# Patient Record
Sex: MALE | Race: BLACK OR AFRICAN AMERICAN | NOT HISPANIC OR LATINO | Employment: UNEMPLOYED | URBAN - METROPOLITAN AREA
[De-identification: names, ages, dates, MRNs, and addresses within clinical notes are randomized per-mention and may not be internally consistent; named-entity substitution may affect disease eponyms.]

---

## 2023-01-21 ENCOUNTER — LAB REQUISITION (OUTPATIENT)
Dept: LAB | Facility: CLINIC | Age: 68
End: 2023-01-21
Payer: COMMERCIAL

## 2023-01-21 DIAGNOSIS — I10 ESSENTIAL (PRIMARY) HYPERTENSION: ICD-10-CM

## 2023-01-23 LAB
ALBUMIN SERPL BCG-MCNC: 3.8 G/DL (ref 3.5–5.2)
ALP SERPL-CCNC: 83 U/L (ref 40–129)
ALT SERPL W P-5'-P-CCNC: 47 U/L (ref 10–50)
ANION GAP SERPL CALCULATED.3IONS-SCNC: 15 MMOL/L (ref 7–15)
AST SERPL W P-5'-P-CCNC: 23 U/L (ref 10–50)
BASOPHILS # BLD AUTO: 0 10E3/UL (ref 0–0.2)
BASOPHILS NFR BLD AUTO: 0 %
BILIRUB DIRECT SERPL-MCNC: <0.2 MG/DL (ref 0–0.3)
BILIRUB SERPL-MCNC: 0.5 MG/DL
BUN SERPL-MCNC: 11.6 MG/DL (ref 8–23)
CALCIUM SERPL-MCNC: 9 MG/DL (ref 8.8–10.2)
CHLORIDE SERPL-SCNC: 99 MMOL/L (ref 98–107)
CREAT SERPL-MCNC: 0.86 MG/DL (ref 0.67–1.17)
DEPRECATED CALCIDIOL+CALCIFEROL SERPL-MC: 27 UG/L (ref 20–75)
DEPRECATED HCO3 PLAS-SCNC: 22 MMOL/L (ref 22–29)
EOSINOPHIL # BLD AUTO: 0 10E3/UL (ref 0–0.7)
EOSINOPHIL NFR BLD AUTO: 1 %
ERYTHROCYTE [DISTWIDTH] IN BLOOD BY AUTOMATED COUNT: 13.9 % (ref 10–15)
GFR SERPL CREATININE-BSD FRML MDRD: >90 ML/MIN/1.73M2
GLUCOSE SERPL-MCNC: 182 MG/DL (ref 70–99)
HCT VFR BLD AUTO: 33.6 % (ref 40–53)
HGB BLD-MCNC: 11 G/DL (ref 13.3–17.7)
IMM GRANULOCYTES # BLD: 0 10E3/UL
IMM GRANULOCYTES NFR BLD: 1 %
LYMPHOCYTES # BLD AUTO: 1.4 10E3/UL (ref 0.8–5.3)
LYMPHOCYTES NFR BLD AUTO: 20 %
MAGNESIUM SERPL-MCNC: 1.8 MG/DL (ref 1.7–2.3)
MCH RBC QN AUTO: 34.2 PG (ref 26.5–33)
MCHC RBC AUTO-ENTMCNC: 32.7 G/DL (ref 31.5–36.5)
MCV RBC AUTO: 104 FL (ref 78–100)
MONOCYTES # BLD AUTO: 0.6 10E3/UL (ref 0–1.3)
MONOCYTES NFR BLD AUTO: 8 %
NEUTROPHILS # BLD AUTO: 4.8 10E3/UL (ref 1.6–8.3)
NEUTROPHILS NFR BLD AUTO: 70 %
NRBC # BLD AUTO: 0 10E3/UL
NRBC BLD AUTO-RTO: 0 /100
PHOSPHATE SERPL-MCNC: 3.6 MG/DL (ref 2.5–4.5)
PLATELET # BLD AUTO: 151 10E3/UL (ref 150–450)
POTASSIUM SERPL-SCNC: 3.9 MMOL/L (ref 3.4–5.3)
PROT SERPL-MCNC: 5.7 G/DL (ref 6.4–8.3)
RBC # BLD AUTO: 3.22 10E6/UL (ref 4.4–5.9)
SODIUM SERPL-SCNC: 136 MMOL/L (ref 136–145)
WBC # BLD AUTO: 6.8 10E3/UL (ref 4–11)

## 2023-01-23 PROCEDURE — 36415 COLL VENOUS BLD VENIPUNCTURE: CPT | Mod: ORL | Performed by: NURSE PRACTITIONER

## 2023-01-23 PROCEDURE — 83735 ASSAY OF MAGNESIUM: CPT | Mod: ORL | Performed by: NURSE PRACTITIONER

## 2023-01-23 PROCEDURE — 84100 ASSAY OF PHOSPHORUS: CPT | Mod: ORL | Performed by: NURSE PRACTITIONER

## 2023-01-23 PROCEDURE — P9604 ONE-WAY ALLOW PRORATED TRIP: HCPCS | Mod: ORL | Performed by: NURSE PRACTITIONER

## 2023-01-23 PROCEDURE — 82248 BILIRUBIN DIRECT: CPT | Mod: ORL | Performed by: NURSE PRACTITIONER

## 2023-01-23 PROCEDURE — 85025 COMPLETE CBC W/AUTO DIFF WBC: CPT | Mod: ORL | Performed by: NURSE PRACTITIONER

## 2023-01-23 PROCEDURE — 82306 VITAMIN D 25 HYDROXY: CPT | Mod: ORL | Performed by: NURSE PRACTITIONER

## 2023-02-14 ENCOUNTER — TELEPHONE (OUTPATIENT)
Dept: TRANSPLANT | Facility: CLINIC | Age: 68
End: 2023-02-14
Payer: COMMERCIAL

## 2023-02-14 DIAGNOSIS — C90.00 MULTIPLE MYELOMA, REMISSION STATUS UNSPECIFIED (H): Primary | ICD-10-CM

## 2023-04-12 ENCOUNTER — CARE COORDINATION (OUTPATIENT)
Dept: TRANSPLANT | Facility: CLINIC | Age: 68
End: 2023-04-12
Payer: COMMERCIAL

## 2023-04-12 NOTE — PROGRESS NOTES
[facility] BMT and Cell Therapy Program  RN Coordinator Pre-Visit Documentation      Fito Deal is a 67 year old male who has been referred to the [facility] BMT and Cell Therapy Program for hematopoietic cell transplant or immune effector cell therapy.      Referring MD Name: Dr. Toi Colón, telephone [phone]    Referring RN Coordinator: Mariel    Reason for referral: multiple myeloma    Link to BMT & CT Program Algorithms      All relevant clinical notes, labs, imaging, and pathology may be reviewed in Epic Bookmarks under name: Rocio Leon      Patient Care Team       Relationship Specialty Notifications Start End    Valerie Kay PCP - General Geriatric Medicine  2/15/23     Merged    Phone: [phone] Fax: [phone]         [address] [place] [address]    Toi Colón MD Referring Physician Hematology & Oncology  2/15/23     Phone: [phone] Fax: [phone]         [facility] [address] [place] [address]    Elenita Harrell MSW    4/10/23     Phone: [phone] Pager: [phone]                Rocio Leon RN

## 2023-04-14 ENCOUNTER — OFFICE VISIT (OUTPATIENT)
Dept: TRANSPLANT | Facility: CLINIC | Age: 68
End: 2023-04-14
Attending: STUDENT IN AN ORGANIZED HEALTH CARE EDUCATION/TRAINING PROGRAM
Payer: COMMERCIAL

## 2023-04-14 VITALS
SYSTOLIC BLOOD PRESSURE: 144 MMHG | TEMPERATURE: 98.6 F | RESPIRATION RATE: 16 BRPM | HEART RATE: 96 BPM | DIASTOLIC BLOOD PRESSURE: 92 MMHG | OXYGEN SATURATION: 98 %

## 2023-04-14 DIAGNOSIS — C90.01 MULTIPLE MYELOMA IN REMISSION (H): Primary | ICD-10-CM

## 2023-04-14 PROCEDURE — G0463 HOSPITAL OUTPT CLINIC VISIT: HCPCS | Performed by: STUDENT IN AN ORGANIZED HEALTH CARE EDUCATION/TRAINING PROGRAM

## 2023-04-14 PROCEDURE — 99205 OFFICE O/P NEW HI 60 MIN: CPT | Performed by: STUDENT IN AN ORGANIZED HEALTH CARE EDUCATION/TRAINING PROGRAM

## 2023-04-14 RX ORDER — POLYETHYLENE GLYCOL 3350 17 G/17G
17 POWDER, FOR SOLUTION ORAL DAILY
COMMUNITY
Start: 2023-03-21

## 2023-04-14 RX ORDER — ATORVASTATIN CALCIUM 40 MG/1
1 TABLET, FILM COATED ORAL DAILY
COMMUNITY
Start: 2021-07-02

## 2023-04-14 RX ORDER — LIDOCAINE 4 G/G
1 PATCH TOPICAL
COMMUNITY
Start: 2022-07-23

## 2023-04-14 RX ORDER — GABAPENTIN 300 MG/1
300 CAPSULE ORAL 3 TIMES DAILY
COMMUNITY

## 2023-04-14 RX ORDER — CYCLOBENZAPRINE HCL 5 MG
10 TABLET ORAL
COMMUNITY
Start: 2023-02-28

## 2023-04-14 RX ORDER — ASPIRIN 325 MG
325 TABLET, DELAYED RELEASE (ENTERIC COATED) ORAL
COMMUNITY

## 2023-04-14 RX ORDER — FUROSEMIDE 20 MG
20 TABLET ORAL DAILY
COMMUNITY

## 2023-04-14 RX ORDER — ALFUZOSIN HYDROCHLORIDE 10 MG/1
1 TABLET, EXTENDED RELEASE ORAL DAILY
COMMUNITY
Start: 2021-10-27

## 2023-04-14 RX ORDER — MORPHINE SULFATE 15 MG/1
TABLET, FILM COATED, EXTENDED RELEASE ORAL
COMMUNITY
Start: 2023-04-06 | End: 2023-05-04

## 2023-04-14 RX ORDER — DULOXETIN HYDROCHLORIDE 60 MG/1
60 CAPSULE, DELAYED RELEASE ORAL DAILY
COMMUNITY
Start: 2023-01-11

## 2023-04-14 RX ORDER — OXYCODONE HYDROCHLORIDE 5 MG/1
15 TABLET ORAL
COMMUNITY
Start: 2023-02-28

## 2023-04-14 RX ORDER — CARVEDILOL 25 MG/1
25 TABLET ORAL
COMMUNITY
Start: 2021-05-16

## 2023-04-14 RX ORDER — VALACYCLOVIR HYDROCHLORIDE 500 MG/1
500 TABLET, FILM COATED ORAL
COMMUNITY
Start: 2022-11-14

## 2023-04-14 RX ORDER — SENNOSIDES A AND B 8.6 MG/1
8.6 TABLET, FILM COATED ORAL
COMMUNITY
Start: 2023-03-21

## 2023-04-14 NOTE — LETTER
4/14/2023         RE: Fito Deal  [address]  [address]  [facility] [address]        Dear Colleague,    Thank you for referring your patient, Fito Deal, to the [facility] BLOOD AND MARROW TRANSPLANT PROGRAM [place]. Please see a copy of my visit note below.         BMT / Cell Therapy Consultation      Fito Deal is a 67 year old male referred by Dr. Toi Colón for multiple myeloma.      Disease presentation and baseline characteristics:   8/4/22: IgG multiple myeloma, standard risk, R-ISS II  M-spike 4.3 at diagnosis, FLC normal    Date Treatment Name Response Side Effects / Toxicities   10/22 DVd x 8 cycles Likely CR (pending full restaging)           HPI:  Please see my entry above for hematologic history.  Mr. Deal is seen today to accompanied by his daughter Sadaf (by phone) to discuss possible autologous stem cell transplant for myeloma.  Mr. Deal is currently living at a nursing home due to his significant caregiver needs.  He is in an electric wheelchair and notes he requires this to move around at all times.  He notes that the majority of his physical limitations come from his back pain which developed in conjunction with his myeloma diagnosis.  He has not been able to walk independently in many months and requires significant assistance at home.  Sadaf notes that a year ago Mr. Deal was much more fit/independent, but she is concerned about his generalized weakness and physical status as it now stands.  Mr. Deal has been recommended for Zometa but has not yet received it due to missing multiple dental appointments.      ASSESSMENT AND PLAN:  We reviewed the risks and benefits of autologous stem cell transplant in detail today.  Based on his performance status Mr. Deal is not a candidate for transplant as the risks of the procedure outweigh the potential benefits.  In terms of performance status at threshold for safely moving ahead with transplant is  approximately a KPS of 70 and Mr. Deal has a KPS of 40.  Both Mr. Deal and Sadaf were in agreement with foregoing transplant after our discussion.    Although he is not a transplant candidate, I do anticipate that Mr. Deal will both need and be able to tolerate a number of standard myeloma therapies and we therefore spent significant time discussing the management of myeloma today.  Mr. Deal defers a number of his medical decisions to Sadaf and she was worried about the risk of secondary malignancies associated with lenalidomide.  I discussed with her that although this risk is real it is relatively low and/malignancies typically occur after years of therapy.  I therefore recommended that at the time of next relapse lenalidomide be incorporated as it is an extremely effective antimyeloma therapy and I worried that omission of IMIDs in general will shorten Mr. Deal's life considerably.  Sadaf felt more comfortable with this and will discuss with Dr Colón at time of next relapse.    I emphasized the importance of Zometa and encouraged Mr. Deal to see his dentist for clearance.    Sadaf notes that Mr. Deal has not been getting adequate rehab services at his nursing home and wonders if he can get a physical therapy referral.  I noted that this would come from Dr. Colón's office but certainly seems appropriate and I will request that she send a referral.    Inability to proceed with autotransplant does not necessarily preclude the use of cellular therapy.  Both bispecific and CAR-T therapy should be considered in later lines of treatment when indicated.    For now I recommend completing a total of 8-12 cycles of DVd followed by single agent maintenance therapy (either daratumumab or velcade).    I called Dr. Colón to discuss these recommendations.    Summary: Recommend completing 8-12 cycles of D-Vd followed by maintenance therapy, transplant not indicated due to performance status.    I spent 60  minutes in the care of this patient today, which included time necessary for preparation for the visit, obtaining history, ordering medications/tests/procedures as medically indicated, review of pertinent medical literature, counseling of the patient, communication of recommendations to the care team, and documentation time.    Jaime Judge MD    ROS:    10 point ROS neg other than the symptoms noted above in the HPI.      Past Medical History:   Diagnosis Date    Glaucoma     OAG RE, traumatic glauc LE    Hypertension     Nonsenile cataract     Proptosis        Past Surgical History:   Procedure Laterality Date    LEG SURGERY      SHOULDER SURGERY         No family history on file.    Social History     Tobacco Use    Smoking status: Former     Types: Cigarettes     Quit date: 2013     Years since quittin.4     Passive exposure: Past   Substance Use Topics    Alcohol use: No         Allergies   Allergen Reactions    Ledipasvir-Sofosbuvir Rash     He developed a rash that required prednisone and Benadryl.  He developed a rash that required prednisone and Benadryl.  He developed a rash that required prednisone and Benadryl.          Current Outpatient Medications   Medication Sig Dispense Refill    alfuzosin ER (UROXATRAL) 10 MG 24 hr tablet Take 1 tablet by mouth daily      alum & mag hydroxide-simethicone 80 mL, diphenhydrAMINE 80 mL, lidocaine (viscous) 80 mL magic mouthwash Take 10 mLs by mouth every 6 hours as needed for mouth sores      aspirin (ASA) 325 MG EC tablet Take 325 mg by mouth      atorvastatin (LIPITOR) 40 MG tablet Take 1 tablet by mouth daily      carvedilol (COREG) 25 MG tablet Take 25 mg by mouth      cyclobenzaprine (FLEXERIL) 5 MG tablet Take 10 mg by mouth      DIAZEPAM PO Take 5 mg by mouth daily      DULoxetine (CYMBALTA) 60 MG capsule Take 60 mg by mouth daily      furosemide (LASIX) 20 MG tablet Take 20 mg by mouth daily      gabapentin (NEURONTIN) 300 MG capsule Take 300 mg  by mouth 3 times daily      IBUPROFEN PO       insulin  UNIT/ML injection Inject 15 units once weekly on days that patient receives dexamethasone.      Lidocaine (LIDOCARE) 4 % Patch Place 1 patch onto the skin      LISINOPRIL PO       magic mouthwash (ENTER INGREDIENTS IN COMMENTS) suspension 5 mLs      morphine (MS CONTIN) 15 MG CR tablet Indications: chronic painTake 1 tablet (15mg) with a 30mg tablet = 45mg TWICE A DAY      multivitamin, therapeutic with minerals (MULTI-VITAMIN) TABS Take 1 tablet by mouth daily      naloxone (NARCAN) 4 MG/0.1ML nasal spray Spray 4 mg in nostril      Omega-3 Fatty Acids (OMEGA-3 FISH OIL PO) Take 1 capsule by mouth daily      oxyCODONE (ROXICODONE) 5 MG tablet Take 15 mg by mouth      polyethylene glycol (MIRALAX) 17 GM/Dose powder Take 17 g by mouth daily      senna (SENOKOT) 8.6 MG tablet Take 8.6 mg by mouth      TRAVATAN Z 0.004 % ophthalmic solution Apply 1 drop to eye At Bedtime 1 Bottle 11    travoprost Z, benzalkonium, (TRAVATAN Z) 0.004 % ophthalmic solution 1 drop At Bedtime BE      UNKNOWN TO PATIENT STATES HE IS NOT SURE WHAT MEDS HE TAKES      valACYclovir (VALTREX) 500 MG tablet Take 500 mg by mouth           Physical Exam:     Vital Signs: BP (!) 144/92 (BP Location: Left arm, Patient Position: Sitting, Cuff Size: Adult Regular)   Pulse 96   Temp 98.6  F (37  C) (Oral)   Resp 16   SpO2 98%     KPS: 40  General Appearance: alert, sitting up in wheelchair, appears frail  Eyes: PERRL, conjunctiva and lids normal, sclera nonicteric  Ears/Nose/M/Throat: Oral mucosa and posterior oropharynx normal, moist mucous membranes  Neck supple, non-tender, free range of motion, no adenopathy  Cardio/Vascular:regular rate and rhythm, normal S1 and S2, no murmur  Resp Effort And Auscultation: Normal - Clear to auscultation without rales, rhonchi, or wheezing.  GI: soft, nontender, bowel sounds present in all four quadrants, no hepatosplenomegaly  Lymphatics:no  significant enlargement of lymph nodes globally   Edema: trace LE edema  Skin: Skin color, texture, turgor normal. No rashes or lesions.  Psych/Affect: Mood and affect are appropriate.    Blood Counts     Recent Labs   Lab Test 01/23/23  1110   HGB 11.0*   HCT 33.6*   WBC 6.8   ANEUTAUTO 4.8   ALYMPAUTO 1.4   AMONOAUTO 0.6   AEOSAUTO 0.0   ABSBASO 0.0   NRBCMAN 0.0          Chemistries     Basic Panel  Recent Labs   Lab Test 01/23/23  1110      POTASSIUM 3.9   CHLORIDE 99   CO2 22   BUN 11.6   CR 0.86   *        Calcium, Magnesium, Phosphorus  Recent Labs   Lab Test 01/23/23  1110   LUCIA 9.0   MAG 1.8   PHOS 3.6        LFTs  Recent Labs   Lab Test 01/23/23  1110   BILITOTAL 0.5   ALKPHOS 83   AST 23   ALT 47   ALBUMIN 3.8     Fito understood the above assessment and recommendations.  Multiple questions answered.  No barriers to learning identified.    ------------------------------------------------------------------------------------------------------------------------------------------------    Patient Care Team         Relationship Specialty Notifications Start End    Valerie Kay PCP - General Geriatric Medicine  2/15/23     Merged    Phone: [phone] Fax: [phone]         [address] [address]    Toi Colón MD Referring Physician Hematology & Oncology  2/15/23     Phone: [phone] Fax: [phone]         [facility] [address] [facility] [address]    Elenita Harrell MSW    4/10/23     Phone: [phone] Pager: [phone]                    Jaime Judge MD

## 2023-04-14 NOTE — NURSING NOTE
Oncology Rooming Note    April 14, 2023 1:08 PM   Fito Deal is a 67 year old male who presents for:    Chief Complaint   Patient presents with     Oncology Clinic Visit     Multiple myeloma      Initial Vitals: BP (!) 144/92 (BP Location: Left arm, Patient Position: Sitting, Cuff Size: Adult Regular)   Pulse 96   Temp 98.6  F (37  C) (Oral)   Resp 16   SpO2 98%  There is no height or weight on file to calculate BMI. There is no height or weight on file to calculate BSA.  Data Unavailable Comment: 6 & 7 for both   No LMP for male patient.  Allergies reviewed: Yes  Medications reviewed: No    Medications: Medication refills not needed today.  Pharmacy name entered into [facility]: [facility]    Clinical concerns: Patient is not aware of the medications they're taking, couldn't review home medications.  Dr. Judge  was notified.      Fransisco Bates

## 2023-04-18 NOTE — PROGRESS NOTES
BMT / Cell Therapy Consultation      Fito Deal is a 67 year old male referred by Dr. Toi Colón for multiple myeloma.      Disease presentation and baseline characteristics:   8/4/22: IgG multiple myeloma, standard risk, R-ISS II  M-spike 4.3 at diagnosis, FLC normal    Date Treatment Name Response Side Effects / Toxicities   10/22 DVd x 8 cycles Likely CR (pending full restaging)           HPI:  Please see my entry above for hematologic history.  Mr. Deal is seen today to accompanied by his daughter Sadaf (by phone) to discuss possible autologous stem cell transplant for myeloma.  Mr. Deal is currently living at a nursing home due to his significant caregiver needs.  He is in an electric wheelchair and notes he requires this to move around at all times.  He notes that the majority of his physical limitations come from his back pain which developed in conjunction with his myeloma diagnosis.  He has not been able to walk independently in many months and requires significant assistance at home.  Sadaf notes that a year ago Mr. Deal was much more fit/independent, but she is concerned about his generalized weakness and physical status as it now stands.  Mr. Deal has been recommended for Zometa but has not yet received it due to missing multiple dental appointments.      ASSESSMENT AND PLAN:  We reviewed the risks and benefits of autologous stem cell transplant in detail today.  Based on his performance status Mr. Deal is not a candidate for transplant as the risks of the procedure outweigh the potential benefits.  In terms of performance status at threshold for safely moving ahead with transplant is approximately a KPS of 70 and Mr. Deal has a KPS of 40.  Both Mr. Deal and Sadaf were in agreement with foregoing transplant after our discussion.    Although he is not a transplant candidate, I do anticipate that Mr. Deal will both need and be able to tolerate a number of standard myeloma  therapies and we therefore spent significant time discussing the management of myeloma today.  Mr. Deal defers a number of his medical decisions to Sadaf and she was worried about the risk of secondary malignancies associated with lenalidomide.  I discussed with her that although this risk is real it is relatively low and/malignancies typically occur after years of therapy.  I therefore recommended that at the time of next relapse lenalidomide be incorporated as it is an extremely effective antimyeloma therapy and I worried that omission of IMIDs in general will shorten Mr. Deal's life considerably.  Sadaf felt more comfortable with this and will discuss with Dr Colón at time of next relapse.    I emphasized the importance of Zometa and encouraged Mr. Deal to see his dentist for clearance.    Sadaf notes that Mr. Deal has not been getting adequate rehab services at his nursing home and wonders if he can get a physical therapy referral.  I noted that this would come from Dr. Colón's office but certainly seems appropriate and I will request that she send a referral.    Inability to proceed with autotransplant does not necessarily preclude the use of cellular therapy.  Both bispecific and CAR-T therapy should be considered in later lines of treatment when indicated.    For now I recommend completing a total of 8-12 cycles of DVd followed by single agent maintenance therapy (either daratumumab or velcade).    I called Dr. Colón to discuss these recommendations.    Summary: Recommend completing 8-12 cycles of D-Vd followed by maintenance therapy, transplant not indicated due to performance status.    I spent 60 minutes in the care of this patient today, which included time necessary for preparation for the visit, obtaining history, ordering medications/tests/procedures as medically indicated, review of pertinent medical literature, counseling of the patient, communication of recommendations to the  care team, and documentation time.    Jaime Judge MD    ROS:    10 point ROS neg other than the symptoms noted above in the HPI.      Past Medical History:   Diagnosis Date     Glaucoma     OAG RE, traumatic glauc LE     Hypertension      Nonsenile cataract      Proptosis        Past Surgical History:   Procedure Laterality Date     LEG SURGERY       SHOULDER SURGERY         No family history on file.    Social History     Tobacco Use     Smoking status: Former     Types: Cigarettes     Quit date: 2013     Years since quittin.4     Passive exposure: Past   Substance Use Topics     Alcohol use: No         Allergies   Allergen Reactions     Ledipasvir-Sofosbuvir Rash     He developed a rash that required prednisone and Benadryl.  He developed a rash that required prednisone and Benadryl.  He developed a rash that required prednisone and Benadryl.          Current Outpatient Medications   Medication Sig Dispense Refill     alfuzosin ER (UROXATRAL) 10 MG 24 hr tablet Take 1 tablet by mouth daily       alum & mag hydroxide-simethicone 80 mL, diphenhydrAMINE 80 mL, lidocaine (viscous) 80 mL magic mouthwash Take 10 mLs by mouth every 6 hours as needed for mouth sores       aspirin (ASA) 325 MG EC tablet Take 325 mg by mouth       atorvastatin (LIPITOR) 40 MG tablet Take 1 tablet by mouth daily       carvedilol (COREG) 25 MG tablet Take 25 mg by mouth       cyclobenzaprine (FLEXERIL) 5 MG tablet Take 10 mg by mouth       DIAZEPAM PO Take 5 mg by mouth daily       DULoxetine (CYMBALTA) 60 MG capsule Take 60 mg by mouth daily       furosemide (LASIX) 20 MG tablet Take 20 mg by mouth daily       gabapentin (NEURONTIN) 300 MG capsule Take 300 mg by mouth 3 times daily       IBUPROFEN PO        insulin  UNIT/ML injection Inject 15 units once weekly on days that patient receives dexamethasone.       Lidocaine (LIDOCARE) 4 % Patch Place 1 patch onto the skin       LISINOPRIL PO        magic mouthwash (ENTER  INGREDIENTS IN COMMENTS) suspension 5 mLs       morphine (MS CONTIN) 15 MG CR tablet Indications: chronic painTake 1 tablet (15mg) with a 30mg tablet = 45mg TWICE A DAY       multivitamin, therapeutic with minerals (MULTI-VITAMIN) TABS Take 1 tablet by mouth daily       naloxone (NARCAN) 4 MG/0.1ML nasal spray Spray 4 mg in nostril       Omega-3 Fatty Acids (OMEGA-3 FISH OIL PO) Take 1 capsule by mouth daily       oxyCODONE (ROXICODONE) 5 MG tablet Take 15 mg by mouth       polyethylene glycol (MIRALAX) 17 GM/Dose powder Take 17 g by mouth daily       senna (SENOKOT) 8.6 MG tablet Take 8.6 mg by mouth       TRAVATAN Z 0.004 % ophthalmic solution Apply 1 drop to eye At Bedtime 1 Bottle 11     travoprost Z, benzalkonium, (TRAVATAN Z) 0.004 % ophthalmic solution 1 drop At Bedtime BE       UNKNOWN TO PATIENT STATES HE IS NOT SURE WHAT MEDS HE TAKES       valACYclovir (VALTREX) 500 MG tablet Take 500 mg by mouth           Physical Exam:     Vital Signs: BP (!) 144/92 (BP Location: Left arm, Patient Position: Sitting, Cuff Size: Adult Regular)   Pulse 96   Temp 98.6  F (37  C) (Oral)   Resp 16   SpO2 98%     KPS: 40  General Appearance: alert, sitting up in wheelchair, appears frail  Eyes: PERRL, conjunctiva and lids normal, sclera nonicteric  Ears/Nose/M/Throat: Oral mucosa and posterior oropharynx normal, moist mucous membranes  Neck supple, non-tender, free range of motion, no adenopathy  Cardio/Vascular:regular rate and rhythm, normal S1 and S2, no murmur  Resp Effort And Auscultation: Normal - Clear to auscultation without rales, rhonchi, or wheezing.  GI: soft, nontender, bowel sounds present in all four quadrants, no hepatosplenomegaly  Lymphatics:no significant enlargement of lymph nodes globally   Edema: trace LE edema  Skin: Skin color, texture, turgor normal. No rashes or lesions.  Psych/Affect: Mood and affect are appropriate.    Blood Counts     Recent Labs   Lab Test 01/23/23  1110   HGB 11.0*   HCT  33.6*   WBC 6.8   ANEUTAUTO 4.8   ALYMPAUTO 1.4   AMONOAUTO 0.6   AEOSAUTO 0.0   ABSBASO 0.0   NRBCMAN 0.0          Chemistries     Basic Panel  Recent Labs   Lab Test 01/23/23  1110      POTASSIUM 3.9   CHLORIDE 99   CO2 22   BUN 11.6   CR 0.86   *        Calcium, Magnesium, Phosphorus  Recent Labs   Lab Test 01/23/23  1110   LUCIA 9.0   MAG 1.8   PHOS 3.6        LFTs  Recent Labs   Lab Test 01/23/23  1110   BILITOTAL 0.5   ALKPHOS 83   AST 23   ALT 47   ALBUMIN 3.8     Fito understood the above assessment and recommendations.  Multiple questions answered.  No barriers to learning identified.    ------------------------------------------------------------------------------------------------------------------------------------------------    Patient Care Team       Relationship Specialty Notifications Start End    Valerie Kay PCP - General Geriatric Medicine  2/15/23     Merged    Phone: [phone] Fax: [phone]         [address] [address]    Toi Colón MD Referring Physician Hematology & Oncology  2/15/23     Phone: [phone] Fax: [phone]         [facility] [address] [facility] [address]    Elenita Harrell MSW    4/10/23     Phone: [phone] Pager: [phone]